# Patient Record
Sex: MALE | Race: OTHER | HISPANIC OR LATINO | Employment: UNEMPLOYED | URBAN - METROPOLITAN AREA
[De-identification: names, ages, dates, MRNs, and addresses within clinical notes are randomized per-mention and may not be internally consistent; named-entity substitution may affect disease eponyms.]

---

## 2022-02-12 ENCOUNTER — HOSPITAL ENCOUNTER (EMERGENCY)
Facility: HOSPITAL | Age: 37
Discharge: HOME | End: 2022-02-12
Attending: EMERGENCY MEDICINE

## 2022-02-12 ENCOUNTER — APPOINTMENT (EMERGENCY)
Dept: RADIOLOGY | Facility: HOSPITAL | Age: 37
End: 2022-02-12

## 2022-02-12 VITALS
OXYGEN SATURATION: 100 % | SYSTOLIC BLOOD PRESSURE: 147 MMHG | TEMPERATURE: 99.9 F | BODY MASS INDEX: 23.96 KG/M2 | HEIGHT: 73 IN | HEART RATE: 87 BPM | RESPIRATION RATE: 16 BRPM | DIASTOLIC BLOOD PRESSURE: 87 MMHG | WEIGHT: 180.78 LBS

## 2022-02-12 DIAGNOSIS — L50.9 URTICARIA: Primary | ICD-10-CM

## 2022-02-12 DIAGNOSIS — M25.472 SWELLING OF LEFT ANKLE JOINT: ICD-10-CM

## 2022-02-12 PROCEDURE — 99283 EMERGENCY DEPT VISIT LOW MDM: CPT

## 2022-02-12 PROCEDURE — 73610 X-RAY EXAM OF ANKLE: CPT

## 2022-02-12 PROCEDURE — 99284 EMERGENCY DEPT VISIT MOD MDM: CPT | Performed by: PHYSICIAN ASSISTANT

## 2022-02-12 RX ORDER — DIPHENHYDRAMINE HCL 25 MG
50 TABLET ORAL EVERY 6 HOURS
Qty: 20 TABLET | Refills: 0 | Status: SHIPPED | OUTPATIENT
Start: 2022-02-12

## 2022-02-12 RX ORDER — PREDNISONE 50 MG/1
50 TABLET ORAL DAILY
Qty: 5 TABLET | Refills: 0 | Status: SHIPPED | OUTPATIENT
Start: 2022-02-12 | End: 2022-02-17

## 2022-02-12 RX ORDER — PREDNISONE 20 MG/1
60 TABLET ORAL ONCE
Status: COMPLETED | OUTPATIENT
Start: 2022-02-12 | End: 2022-02-12

## 2022-02-12 RX ORDER — FAMOTIDINE 20 MG/1
20 TABLET, FILM COATED ORAL ONCE
Status: COMPLETED | OUTPATIENT
Start: 2022-02-12 | End: 2022-02-12

## 2022-02-12 RX ORDER — DIPHENHYDRAMINE HCL 25 MG
50 TABLET ORAL ONCE
Status: COMPLETED | OUTPATIENT
Start: 2022-02-12 | End: 2022-02-12

## 2022-02-12 RX ADMIN — FAMOTIDINE 20 MG: 20 TABLET ORAL at 16:58

## 2022-02-12 RX ADMIN — PREDNISONE 60 MG: 20 TABLET ORAL at 16:57

## 2022-02-12 RX ADMIN — DIPHENHYDRAMINE HYDROCHLORIDE 50 MG: 25 TABLET ORAL at 16:58

## 2022-02-12 NOTE — ED PROVIDER NOTES
History  Chief Complaint   Patient presents with    Ankle Pain     Pt presents to ED from home w/ left ankle pain (denies injury) and generalized rash for 8 days  Pt denies pmh   Rash     Pt with no PMH, no PSH  Presents to ED c/o 8 day h/o constant, atraumatic left ankle/calf pain/swelling and c/o 1 week h/o generalized, red, puritic rash, no sob, no oral swelling, no cp, no fever, worse with walking, has been improving but sx persist   Pt here x 2+ months from 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, dust, no other known possible allergens  Does play soccer, but not recently, no other known trauma  None       History reviewed  No pertinent past medical history  History reviewed  No pertinent surgical history  History reviewed  No pertinent family history  I have reviewed and agree with the history as documented  E-Cigarette/Vaping    E-Cigarette Use Never User      E-Cigarette/Vaping Substances     Social History     Tobacco Use    Smoking status: Current Every Day Smoker     Packs/day: 1 00     Types: Cigarettes    Smokeless tobacco: Never Used   Vaping Use    Vaping Use: Never used   Substance Use Topics    Alcohol use: Not Currently    Drug use: Not Currently       Review of Systems   Constitutional: Negative for chills and fever  HENT: Negative for hearing loss and sore throat  Eyes: Negative for visual disturbance  Respiratory: Negative for cough and shortness of breath  Cardiovascular: Negative for chest pain and leg swelling  Gastrointestinal: Negative for abdominal pain and vomiting  Genitourinary: Negative for dysuria and frequency  Musculoskeletal: Positive for gait problem and myalgias  Skin: Positive for rash  Negative for color change and pallor  Neurological: Negative for dizziness and weakness  Psychiatric/Behavioral: Negative for behavioral problems  All other systems reviewed and are negative        Physical Exam  Physical Exam  Vitals and nursing note reviewed  Constitutional:       General: He is not in acute distress  Appearance: He is well-developed  HENT:      Head: Normocephalic and atraumatic  Right Ear: External ear normal       Left Ear: External ear normal       Nose: Nose normal       Mouth/Throat:      Mouth: Mucous membranes are moist       Pharynx: Oropharynx is clear  No oropharyngeal exudate or posterior oropharyngeal erythema  Eyes:      Conjunctiva/sclera: Conjunctivae normal    Cardiovascular:      Rate and Rhythm: Normal rate and regular rhythm  Pulmonary:      Effort: Pulmonary effort is normal  No respiratory distress  Breath sounds: Normal breath sounds  Abdominal:      General: Bowel sounds are normal       Palpations: Abdomen is soft  Musculoskeletal:         General: Swelling and tenderness present  Normal range of motion  Cervical back: Normal range of motion  Comments: Mild diffuse tenderness and swelling noted along left lateral lower leg and left ankle joint, small effusion noted in joint, subtle central lesion that appears different then urticarial rash that appears like raised rash with central clearing and puncture with surrounding darker purplish discoloration, ecchymosis  See 1st pic below   Skin:     General: Skin is warm and dry  Findings: Lesion and rash present  Comments: Generalized urticarial rash noted to B/L upper and lower extremities, spares trunk  Neurological:      Mental Status: He is alert and oriented to person, place, and time  Motor: No weakness     Psychiatric:         Behavior: Behavior normal                  Vital Signs  ED Triage Vitals   Temperature Pulse Respirations Blood Pressure SpO2   02/12/22 1621 02/12/22 1617 02/12/22 1617 02/12/22 1617 02/12/22 1617   99 9 °F (37 7 °C) 87 16 147/87 100 %      Temp Source Heart Rate Source Patient Position - Orthostatic VS BP Location FiO2 (%)   02/12/22 1621 -- -- -- --   Oral Pain Score       --                  Vitals:    02/12/22 1617   BP: 147/87   Pulse: 87         Visual Acuity      ED Medications  Medications   diphenhydrAMINE (BENADRYL) tablet 50 mg (50 mg Oral Given 2/12/22 1658)   predniSONE tablet 60 mg (60 mg Oral Given 2/12/22 1657)   famotidine (PEPCID) tablet 20 mg (20 mg Oral Given 2/12/22 1658)       Diagnostic Studies  Results Reviewed     None                 XR ankle 3+ views LEFT   ED Interpretation by Leonid Padilla PA-C (02/12 1721)   No fx                  Procedures  Procedures         ED Course  ED Course as of 02/12/22 1758   Sat Feb 12, 2022   1746 1745, subtle improvement in rash  Rash it urticarial in nature, from some allergen, likely contact since it spares trunk  Left ankle joint swelling, inflammatory, possibly insect bite just superior to area, tx will be same  NO signs of acute infection                                             MDM    Disposition  Final diagnoses:   Urticaria   Swelling of left ankle joint     Time reflects when diagnosis was documented in both MDM as applicable and the Disposition within this note     Time User Action Codes Description Comment    2/12/2022  5:48 PM Mayank Nelson Add [L50 9] Urticaria     2/12/2022  5:48 PM Mayank Nelson Add [M25 472] Swelling of left ankle joint       ED Disposition     ED Disposition Condition Date/Time Comment    Discharge Stable Sat Feb 12, 2022  5:48 PM [facility] discharge to home/self care              Follow-up Information     Follow up With Specialties Details Why Contact Info Additional Information    [address] Family [facility]   [address] [facility] [address]  [facility] [address] [place] [address], [address] [facility], [facility] [address], [place], [place], [address], [facility]   [address] [address] [phone] [facility] [place], [place], [address]          Patient's Medications   Discharge Prescriptions    DIPHENHYDRAMINE (BENADRYL) 25 MG TABLET    Take 2 tablets (50 mg total) by mouth every 6 (six) hours       Start Date: 2/12/2022 End Date: --       Order Dose: 50 mg       Quantity: 20 tablet    Refills: 0    PREDNISONE 50 MG TABLET    Take 1 tablet (50 mg total) by mouth daily for 5 days       Start Date: 2/12/2022 End Date: 2/17/2022       Order Dose: 50 mg       Quantity: 5 tablet    Refills: 0       No discharge procedures on file      PDMP Review     None          ED Provider  Electronically Signed by           Jose D Covarrubias PA-C  02/12/22 2374

## 2022-02-12 NOTE — DISCHARGE INSTRUCTIONS
Use Motrin 600mg every 6 hours for pain/swelling to ankle/ lower leg  Use ice and keep elevated to help with swelling, rest      Take all oral steroids until done  Use Benadryl 50mg every 6 hours for itching and rash  Try to avoid the dust and wear clothes that prevent exposure troy arms/legs    Follow-up with your doctor in next few days